# Patient Record
Sex: FEMALE | Race: WHITE | NOT HISPANIC OR LATINO | Employment: UNEMPLOYED | URBAN - METROPOLITAN AREA
[De-identification: names, ages, dates, MRNs, and addresses within clinical notes are randomized per-mention and may not be internally consistent; named-entity substitution may affect disease eponyms.]

---

## 2020-09-01 ENCOUNTER — OFFICE VISIT (OUTPATIENT)
Dept: SPINE | Facility: CLINIC | Age: 35
End: 2020-09-01
Payer: MEDICARE

## 2020-09-01 VITALS — WEIGHT: 170 LBS | HEIGHT: 65 IN | BODY MASS INDEX: 28.32 KG/M2

## 2020-09-01 DIAGNOSIS — M54.50 CHRONIC BILATERAL LOW BACK PAIN WITHOUT SCIATICA: Primary | ICD-10-CM

## 2020-09-01 DIAGNOSIS — G89.29 CHRONIC BILATERAL LOW BACK PAIN WITHOUT SCIATICA: Primary | ICD-10-CM

## 2020-09-01 PROCEDURE — 99204 PR OFFICE/OUTPT VISIT, NEW, LEVL IV, 45-59 MIN: ICD-10-PCS | Mod: S$GLB,,, | Performed by: PHYSICAL MEDICINE & REHABILITATION

## 2020-09-01 PROCEDURE — 99204 OFFICE O/P NEW MOD 45 MIN: CPT | Mod: S$GLB,,, | Performed by: PHYSICAL MEDICINE & REHABILITATION

## 2020-09-01 RX ORDER — TRAMADOL HYDROCHLORIDE 50 MG/1
100 TABLET ORAL EVERY 6 HOURS PRN
COMMUNITY
Start: 2020-06-11 | End: 2020-09-09

## 2020-09-01 RX ORDER — HYDROCODONE BITARTRATE AND ACETAMINOPHEN 5; 325 MG/1; MG/1
1 TABLET ORAL EVERY 6 HOURS PRN
COMMUNITY

## 2020-09-01 RX ORDER — TRAZODONE HYDROCHLORIDE 50 MG/1
50 TABLET ORAL
COMMUNITY
Start: 2020-06-11 | End: 2021-06-11

## 2020-09-01 RX ORDER — CELECOXIB 200 MG/1
200 CAPSULE ORAL
COMMUNITY
Start: 2020-03-18 | End: 2021-03-18

## 2020-09-01 RX ORDER — METHOCARBAMOL 500 MG/1
500 TABLET, FILM COATED ORAL 3 TIMES DAILY PRN
COMMUNITY
Start: 2020-07-14

## 2020-09-01 RX ORDER — CITALOPRAM 20 MG/1
20 TABLET, FILM COATED ORAL
COMMUNITY
Start: 2020-03-18 | End: 2021-03-18

## 2020-09-01 NOTE — PROGRESS NOTES
SUBJECTIVE:    Patient ID: Brooklynn Marrufo is a 35 y.o. female.    Chief Complaint: Back Pain    This is a 35-year-old woman who sees Dr. Jason Pollard for her primary care.  Has history of traumatic brain injury otherwise denies any chronic major medical problems.  Long history of back problems.  She underwent an unspecified back surgery in July of last year done by Dr. Brando Rabago.  Prior to the procedure she was having left-sided low back pain and left radicular leg pain.  Following the procedure she had resolution of the left leg symptoms.  She had good pain control for about 5 or 6 months and then started to develop recurrent low back pain.  She was subsequently referred by Dr. Rabago to Dr. Newton who has done epidural steroid injections of some sort.  Sounds like she had medial branch blocks in preparation for RFA.  Sounds like the diagnostic blocks helped.  She denies any radicular leg pain or weakness.  No bowel or bladder dysfunction fever chills sweats or unexpected weight loss.  She has been maintained on Celebrex and tramadol 100 mg Q 6 hr.  Current pain level is 2/10.  She has had an MRI on her lumbar spine since her surgery but she did not bring it and I can not access it.  I do not have access to her other physician notes other than her primary care provider        History reviewed. No pertinent past medical history.  Social History     Socioeconomic History    Marital status:      Spouse name: Not on file    Number of children: Not on file    Years of education: Not on file    Highest education level: Not on file   Occupational History    Not on file   Social Needs    Financial resource strain: Not on file    Food insecurity     Worry: Not on file     Inability: Not on file    Transportation needs     Medical: Not on file     Non-medical: Not on file   Tobacco Use    Smoking status: Never Smoker   Substance and Sexual Activity    Alcohol Use     Frequency: Never    Drug use:  "Never    Sexual activity: Yes     Partners: Male   Lifestyle    Physical activity     Days per week: Not on file     Minutes per session: Not on file    Stress: Not on file   Relationships    Social connections     Talks on phone: Not on file     Gets together: Not on file     Attends Protestant service: Not on file     Active member of club or organization: Not on file     Attends meetings of clubs or organizations: Not on file     Relationship status: Not on file   Other Topics Concern    Not on file   Social History Narrative    Not on file     History reviewed. No pertinent surgical history.  History reviewed. No pertinent family history.  Vitals:    09/01/20 1314   Weight: 77.1 kg (170 lb)   Height: 5' 5" (1.651 m)       Review of Systems   Constitutional: Negative for chills, diaphoresis, fatigue, fever and unexpected weight change.   HENT: Negative for trouble swallowing.    Eyes: Negative for visual disturbance.   Respiratory: Negative for shortness of breath.    Cardiovascular: Negative for chest pain.   Gastrointestinal: Negative for abdominal pain, constipation, nausea and vomiting.   Genitourinary: Negative for difficulty urinating.   Musculoskeletal: Negative for arthralgias, back pain, gait problem, joint swelling, myalgias, neck pain and neck stiffness.   Neurological: Negative for dizziness, speech difficulty, weakness, light-headedness, numbness and headaches.          Objective:      Physical Exam  Neurological:      Mental Status: She is alert and oriented to person, place, and time.      Comments: She is awake and in no acute distress.  Mild tenderness to palpation lumbosacral junction.  No external lesions or palpable masses  Forward flexion is to about 45° before she complains of pain at the lumbosacral junction.  Extension causes no pain  She can heel and toe walk normally  Deep tendon reflexes +2 at both knees and +1 at both ankles  Strength is normal in both lower extremities  Straight " leg raising negative bilaterally  TITO testing negative bilaterally             Assessment:       1. Chronic bilateral low back pain without sciatica           Plan:     she has a nonfocal examination from a neurological standpoint and no historical red flags.  She has chronic low back pain following a back surgery in July.  I do not have any images to review.  I am not certain what treatment she has already had.  I will try to get records from her other providers and she can bring the MRI for me to review.  I did advise her that if their plan is to perform radiofrequency ablation and she has had improvement with the diagnostic blocks that I would agree with that.  She intends to contact their office and go through with the radiofrequency ablation.  She can follow up with me on as-needed base      Chronic bilateral low back pain without sciatica